# Patient Record
Sex: FEMALE | Race: WHITE | NOT HISPANIC OR LATINO | Employment: FULL TIME | URBAN - METROPOLITAN AREA
[De-identification: names, ages, dates, MRNs, and addresses within clinical notes are randomized per-mention and may not be internally consistent; named-entity substitution may affect disease eponyms.]

---

## 2021-08-24 ENCOUNTER — OFFICE VISIT (OUTPATIENT)
Dept: GASTROENTEROLOGY | Facility: CLINIC | Age: 41
End: 2021-08-24
Payer: COMMERCIAL

## 2021-08-24 VITALS
HEART RATE: 92 BPM | BODY MASS INDEX: 29.73 KG/M2 | HEIGHT: 66 IN | DIASTOLIC BLOOD PRESSURE: 84 MMHG | WEIGHT: 185 LBS | SYSTOLIC BLOOD PRESSURE: 118 MMHG

## 2021-08-24 DIAGNOSIS — K43.9 ABDOMINAL WALL HERNIA: Primary | ICD-10-CM

## 2021-08-24 PROCEDURE — 99204 OFFICE O/P NEW MOD 45 MIN: CPT | Performed by: INTERNAL MEDICINE

## 2021-08-24 NOTE — PROGRESS NOTES
[facility] - Outpatient Consultation  flor Callahan 47 y o  female MRN: [id number]  Encounter: [id number]          ASSESSMENT AND PLAN:      1  Abdominal wall hernia     after long discussion of the patient's condition in the absence of any significant symptoms such as abdominal pain or persistent herniation, I have recommended that she not do anything further to per surgeon surgery at this point time  There are no further diagnostic tests that are medically necessary at this time  Follow-up to be arranged on an as-needed basis  ______________________________________________________________________    HPI:    This 37-year-old female comes the office today stating that since her last childbirth several years ago she has had an abdominal wall herniation  She believes that this was a hiatal hernia  She did admit to an episode of heartburn last month  She states she has had 2 episodes that were similar  Approximately 1 hour after eating she felt an extreme gaseousness and then this sensation radiated around to the back and that was accompanied by heartburn as well as chest pain and then the symptoms resolved  She has had no subsequent problems since then  She admits to having lost 20 lb over the past month  She denies any ongoing abdominal pains  She states she can see the bulge depending upon what she is doing and she was worried that if she were to be kicked in the bulge that this might do harm  She denies any dysphagia or odynophagia  She denies any rectal bleeding  She denies any diarrhea or constipation  She denies any bloating or gaseousness  There is no nausea or vomiting  REVIEW OF SYSTEMS:    CONSTITUTIONAL: Denies any fever, chills, rigors, and weight loss  HEENT: No earache or tinnitus  Denies hearing loss or visual disturbances  CARDIOVASCULAR: No chest pain or palpitations     RESPIRATORY: Denies any cough, hemoptysis, shortness of breath or dyspnea on exertion  GASTROINTESTINAL: As noted in the History of Present Illness  GENITOURINARY: No problems with urination  Denies any hematuria or dysuria  NEUROLOGIC: No dizziness or vertigo, denies headaches  MUSCULOSKELETAL: Denies any muscle or joint pain  SKIN: Denies skin rashes or itching  ENDOCRINE: Denies excessive thirst  Denies intolerance to heat or cold  PSYCHOSOCIAL: Denies depression or anxiety  Denies any recent memory loss  Historical Information   History reviewed  No pertinent past medical history  Past Surgical History:   Procedure Laterality Date     SECTION       Social History   Social History     Substance and Sexual Activity   Alcohol Use Never     Social History     Substance and Sexual Activity   Drug Use Never     Social History     Tobacco Use   Smoking Status Never Smoker   Smokeless Tobacco Never Used     Family History   Problem Relation Age of Onset    No Known Problems Mother     No Known Problems Father        Meds/Allergies     No current outpatient medications on file  No Known Allergies        Objective     Blood pressure 118/84, pulse 92, height 5' 6" (1 676 m), weight 83 9 kg (185 lb)  Body mass index is 29 86 kg/m²  PHYSICAL EXAM:      General Appearance:   Alert, cooperative, no distress   HEENT:   Normocephalic, atraumatic, anicteric      Neck:  Supple, symmetrical, trachea midline   Lungs:   Clear to auscultation bilaterally; no rales, rhonchi or wheezing; respirations unlabored    Heart[de-identified]   Regular rate and rhythm; no murmur, rub, or gallop  Abdomen:   Soft, non-tender, non-distended; normal bowel sounds; no masses, no organomegaly; There is a definitive defect in the midline of the rectus abdominus muscle above the umbilicus the corresponds to a ventral wall hernia    There are no surgical scars present over this area     Genitalia:   Deferred    Rectal:   Deferred    Extremities:  No cyanosis, clubbing or edema    Pulses:  2+ and symmetric    Skin:  No jaundice, rashes, or lesions    Lymph nodes:  No palpable cervical lymphadenopathy        Lab Results:   No visits with results within 1 Day(s) from this visit  Latest known visit with results is:   No results found for any previous visit  Radiology Results:   No results found

## 2023-11-07 ENCOUNTER — TELEPHONE (OUTPATIENT)
Dept: PSYCHIATRY | Facility: CLINIC | Age: 43
End: 2023-11-07

## 2023-11-07 NOTE — TELEPHONE ENCOUNTER
Patient has been added to the Medication Management wait list without a referral.    Insurance: [employer]  Insurance Type:    Commercial [x]   Medicaid []   [place] (if applicable)   Medicare []  Location Preference: [place]  Provider Preference: pref fem prov  Virtual: Yes [] No [x]  Were outside resources sent: Yes [] No [x]

## 2025-03-13 ENCOUNTER — TELEPHONE (OUTPATIENT)
Age: 45
End: 2025-03-13

## 2025-03-13 NOTE — TELEPHONE ENCOUNTER
Received call from Patient for New Patient - SOC - moles on face - wants to remove them. Scheduled 5/19/25 2:20 pm [facility]. Patient is currently un-insured and will be paying out of pocket. Provided [place] addr.     Patient verbalized understanding.

## 2025-05-12 ENCOUNTER — TELEPHONE (OUTPATIENT)
Age: 45
End: 2025-05-12

## 2025-05-19 ENCOUNTER — TELEPHONE (OUTPATIENT)
Age: 45
End: 2025-05-19